# Patient Record
Sex: MALE | Race: WHITE | NOT HISPANIC OR LATINO | URBAN - METROPOLITAN AREA
[De-identification: names, ages, dates, MRNs, and addresses within clinical notes are randomized per-mention and may not be internally consistent; named-entity substitution may affect disease eponyms.]

---

## 2019-02-05 ENCOUNTER — EMERGENCY (EMERGENCY)
Facility: HOSPITAL | Age: 69
LOS: 1 days | Discharge: HOME | End: 2019-02-05
Attending: EMERGENCY MEDICINE | Admitting: EMERGENCY MEDICINE
Payer: MEDICARE

## 2019-02-05 VITALS
OXYGEN SATURATION: 97 % | SYSTOLIC BLOOD PRESSURE: 155 MMHG | RESPIRATION RATE: 18 BRPM | DIASTOLIC BLOOD PRESSURE: 89 MMHG | WEIGHT: 169.98 LBS | HEART RATE: 63 BPM | HEIGHT: 71 IN | TEMPERATURE: 98 F

## 2019-02-05 DIAGNOSIS — R51 HEADACHE: ICD-10-CM

## 2019-02-05 DIAGNOSIS — E78.5 HYPERLIPIDEMIA, UNSPECIFIED: ICD-10-CM

## 2019-02-05 DIAGNOSIS — Z23 ENCOUNTER FOR IMMUNIZATION: ICD-10-CM

## 2019-02-05 DIAGNOSIS — S00.01XA ABRASION OF SCALP, INITIAL ENCOUNTER: ICD-10-CM

## 2019-02-05 DIAGNOSIS — S09.90XA UNSPECIFIED INJURY OF HEAD, INITIAL ENCOUNTER: ICD-10-CM
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
## 2019-02-05 DIAGNOSIS — M54.2 CERVICALGIA: ICD-10-CM
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
## 2019-02-05 PROCEDURE — 99284 EMERGENCY DEPT VISIT MOD MDM: CPT

## 2019-02-05 PROCEDURE — 90715 TDAP VACCINE 7 YRS/> IM: CPT

## 2019-02-05 PROCEDURE — 72125 CT NECK SPINE W/O DYE: CPT | Mod: 26

## 2019-02-05 PROCEDURE — 90471 IMMUNIZATION ADMIN: CPT

## 2019-02-05 PROCEDURE — 70450 CT HEAD/BRAIN W/O DYE: CPT | Mod: 26

## 2019-02-05 PROCEDURE — 70450 CT HEAD/BRAIN W/O DYE: CPT

## 2019-02-05 PROCEDURE — 99284 EMERGENCY DEPT VISIT MOD MDM: CPT | Mod: 25

## 2019-02-05 PROCEDURE — 72125 CT NECK SPINE W/O DYE: CPT

## 2019-02-05 RX ORDER — TETANUS TOXOID, REDUCED DIPHTHERIA TOXOID AND ACELLULAR PERTUSSIS VACCINE, ADSORBED 5; 2.5; 8; 8; 2.5 [IU]/.5ML; [IU]/.5ML; UG/.5ML; UG/.5ML; UG/.5ML
0.5 SUSPENSION INTRAMUSCULAR ONCE
Qty: 0 | Refills: 0 | Status: COMPLETED | OUTPATIENT
Start: 2019-02-05 | End: 2019-02-05

## 2019-02-05 RX ADMIN — TETANUS TOXOID, REDUCED DIPHTHERIA TOXOID AND ACELLULAR PERTUSSIS VACCINE, ADSORBED 0.5 MILLILITER(S): 5; 2.5; 8; 8; 2.5 SUSPENSION INTRAMUSCULAR at 20:21

## 2019-02-05 NOTE — ED ADULT TRIAGE NOTE - CHIEF COMPLAINT QUOTE
Patient s/p tripped and fall at work and hit his head on metal rail ling , c/o neck pain and slight headache , denies any loc . Not on thinner .

## 2019-02-05 NOTE — ED PROVIDER NOTE - OBJECTIVE STATEMENT
Pt w/ PMHx pre-DM, HLD, walking outside when he tripped outside, bent over trying to catch his balance, hit his head on a metal pole attached to a wall, and the fall backwards onto his buttock, but did not hit his head again. No LOC. 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 Pain was 5/10, now 3/10 w/o intervention. No confusion, n/v, dizziness. No preceding CP, SOB, palpitations, dizziness, or syncope. No AC use. No heavy alcohol use.

## 2019-02-05 NOTE — ED ADULT NURSE NOTE - NSIMPLEMENTINTERV_GEN_ALL_ED
Implemented All Fall Risk Interventions:  [place] to call system. Call bell, personal items and telephone within reach. Instruct patient to call for assistance. Room bathroom lighting operational. Non-slip footwear when patient is off stretcher. Physically safe environment: no spills, clutter or unnecessary equipment. Stretcher in lowest position, wheels locked, appropriate side rails in place. Provide visual cue, wrist band, yellow gown, etc. Monitor gait and stability. Monitor for mental status changes and reorient to person, place, and time. Review medications for side effects contributing to fall risk. Reinforce activity limits and safety measures with patient and family.

## 2019-02-05 NOTE — ED PROVIDER NOTE - CARE PLAN
Principal Discharge DX:	Fall, initial encounter  Secondary Diagnosis:	Minor head injury, initial encounter

## 2019-02-05 NOTE — ED PROVIDER NOTE - PHYSICAL EXAMINATION
Constitutional: Well appearing, well nourished, awake, alert, oriented to person, place, time/situation and in no apparent distress.  Head + small abrasions to top of fronto-parietal junction on R scalp. no underlying hematoma. No crepitus or instability  ENMT: Airway patent. Normal MM  Eyes: Clear bilaterally. PERRL. EOMI  Cardiac: Normal rate, regular rhythm.  Heart sounds S1, S2.  No murmurs, rubs or gallops.  Respiratory: Breaths sounds equal and clear b/l. No increased WOB, tachypnea, hypoxia, or accessory mm use. Pt speaks in full sentences.   Gastrointestinal: Abd soft, NT, ND, NABS. No guarding, rebound, or rigidity. No pulsatile abdominal masses. No organomegaly appreciated. No CVAT   Musculoskeletal: Range of motion is not limited. Midline spine non tender. FROM neck w/o midline pain. + mild pain in L lateral neck w/ ROM. FROM in all joints x 4 ext w/o dec ROM or pain. Pelvis stable  Neuro: Alert and oriented x 3, face symmetric and speech fluent. Strength 5/5 x 4 ext and symmetric, nml gross motor movement, nml gait. No focal deficits noted.   Skin: Skin normal color for race, warm, dry and intact. No evidence of rash.  Psych: Alert and oriented to person, place, time/situation. normal mood and affect. no apparent risk to self or others.

## 2019-02-05 NOTE — ED ADULT NURSE NOTE - CHPI ED NUR SYMPTOMS NEG
no blurred vision/no dizziness/no confusion/no syncope/no seizure/no weakness/no vomiting/no loss of consciousness/no nausea

## 2019-02-05 NOTE — ED PROVIDER NOTE - MEDICAL DECISION MAKING DETAILS
Pt presents s/p mechanical fall w/ minor head inj. No LOC. Pt c/o scalp pain and L lateral neck pain. Declines analgesia. Last TDaP > 10 years. No neuro complaints or deficits. No other injuries or complaints. Update TDaP, CT head / c-spine. Dispo pending w/u and clinical status

## 2019-02-05 NOTE — ED ADULT NURSE REASSESSMENT NOTE - NS ED NURSE REASSESS COMMENT FT1
CT scan resulted, C collar cleared by Dr. Marsh, no headache or neuro deficits, vital signs stable, ambulates w/ steady gait, discharged to [facility] in stable condition.

## 2019-02-05 NOTE — ED PROVIDER NOTE - NSFOLLOWUPINSTRUCTIONS_ED_ALL_ED_FT
You were evaluated in the ED after a mechanical fall. You had a CT of the head and cervical spine which did not reveal any acute abnormalities. Take Tylenol as need for pain. You received a tetanus vaccination today, good for the next 10 years. Read the attached education on postconcussive symptoms. Return for severe headache, confusion, dizziness, stroke-like symptoms, persistent vomiting, or other concerning symptoms.     Closed Head Injury    A closed head injury is an injury to your head that may or may not involve a traumatic brain injury (TBI). Symptoms of TBI can be short or long lasting and include headache, dizziness, interference with memory or speech, fatigue, confusion, changes in sleep, mood changes, nausea, depression/anxiety, and dulling of senses. Make sure to obtain proper rest which includes getting plenty of sleep, avoiding excessive visual stimulation, and avoiding activities that may cause physical or mental stress. Avoid any situation where there is potential for another head injury, including sports.    SEEK IMMEDIATE MEDICAL CARE IF YOU HAVE ANY OF THE FOLLOWING SYMPTOMS: unusual drowsiness, vomiting, severe dizziness, seizures, lightheadedness, muscular weakness, different pupil sizes, visual changes, or clear or bloody discharge from your ears or nose.     Post-Concussion Syndrome  Post-concussion syndrome describes the symptoms that can occur after a head injury. These symptoms can last from weeks to months.    What are the causes?  It is not clear why some head injuries cause post-concussion syndrome. It can occur whether your head injury was mild or severe and whether you were wearing head protection or not.    What are the signs or symptoms?  Memory difficulties.  Dizziness.  Headaches.  Double vision or blurry vision.  Sensitivity to light.  Hearing difficulties.  Depression.  Tiredness.  Weakness.  Difficulty with concentration.  Difficulty sleeping or staying asleep.  Vomiting.  Poor balance or instability on your feet.  Slow reaction time.  ImageDifficulty learning and remembering things you have heard.  How is this diagnosed?  There is no test to determine whether you have post-concussion syndrome. Your health care provider may order an imaging scan of your brain, such as a CT scan, to check for other problems that may be causing your symptoms (such as a severe injury inside your skull).    How is this treated?  Usually, these problems disappear over time without medical care. Your health care provider may prescribe medicine to help ease your symptoms. It is important to follow up with a neurologist to evaluate your recovery and address any lingering symptoms or issues.    Follow these instructions at home:  Take medicines only as directed by your health care provider. Do not take aspirin. Aspirin can slow blood clotting.  Sleep with your head slightly elevated to help with headaches.  Avoid any situation where there is potential for another head injury. This includes football, hockey, soccer, basketball, martial arts, downhill snow sports, and horseback riding. Your condition will get worse every time you experience a concussion. You should avoid these activities until you are evaluated by the appropriate follow-up health care providers.  Keep all follow-up visits as directed by your health care provider. This is important.  Contact a health care provider if:  You have increased problems paying attention or concentrating.  You have increased difficulty remembering or learning new information.  You need more time to complete tasks or assignments than before.  You have increased irritability or decreased ability to cope with stress.  You have more symptoms than before.  Seek medical care if you have any of the following symptoms for more than two weeks after your injury:    Lasting (chronic) headaches.  Dizziness or balance problems.  Nausea.  Vision problems.  Increased sensitivity to noise or light.  Depression or mood swings.  Anxiety or irritability.  Memory problems.  Difficulty concentrating or paying attention.  Sleep problems.  Feeling tired all the time.    Get help right away if:  You have confusion or unusual drowsiness.  Others find it difficult to wake you up.  You have nausea or persistent, forceful vomiting.  You feel like you are moving when you are not (vertigo). Your eyes may move rapidly back and forth.  You have convulsions or faint.  You have severe, persistent headaches that are not relieved by medicine.  You cannot use your arms or legs normally.  One of your pupils is larger than the other.  You have clear or bloody discharge from your nose or ears.  Your problems are getting worse, not better.  This information is not intended to replace advice given to you by your health care provider. Make sure you discuss any questions you have with your health care provider.

## 2019-02-05 NOTE — ED PROVIDER NOTE - PROGRESS NOTE DETAILS
Prelim neg. No midline spinal ttp, FROM neck w/o midline pain. D/w pt results, postconcussive sx, return precautions

## 2023-06-08 NOTE — ED ADULT NURSE NOTE - MUSCULOSKELETAL WDL
Full range of motion of upper and lower extremities, no joint tenderness/swelling. Xenograft Text: The defect edges were debeveled with a #15 scalpel blade.  Given the location of the defect, shape of the defect and the proximity to free margins a xenograft was deemed most appropriate.  The graft was then trimmed to fit the size of the defect.  The graft was then placed in the primary defect and oriented appropriately.
